# Patient Record
Sex: FEMALE | Race: WHITE | NOT HISPANIC OR LATINO | Employment: STUDENT | URBAN - METROPOLITAN AREA
[De-identification: names, ages, dates, MRNs, and addresses within clinical notes are randomized per-mention and may not be internally consistent; named-entity substitution may affect disease eponyms.]

---
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
## 2020-09-30 ENCOUNTER — TRANSFERRED RECORDS (OUTPATIENT)
Dept: HEALTH INFORMATION MANAGEMENT | Facility: CLINIC | Age: 20
End: 2020-09-30

## 2021-03-26 ENCOUNTER — MEDICAL CORRESPONDENCE (OUTPATIENT)
Dept: HEALTH INFORMATION MANAGEMENT | Facility: CLINIC | Age: 21
End: 2021-03-26

## 2021-03-26 ENCOUNTER — TRANSFERRED RECORDS (OUTPATIENT)
Dept: HEALTH INFORMATION MANAGEMENT | Facility: CLINIC | Age: 21
End: 2021-03-26

## 2021-03-30 ENCOUNTER — TRANSCRIBE ORDERS (OUTPATIENT)
Dept: OTHER | Age: 21
End: 2021-03-30

## 2021-03-30 DIAGNOSIS — R55 VASOVAGAL SYNCOPE: Primary | ICD-10-CM

## 2021-04-12 ENCOUNTER — OFFICE VISIT (OUTPATIENT)
Dept: CARDIOLOGY | Facility: CLINIC | Age: 21
End: 2021-04-12
Attending: NURSE PRACTITIONER
Payer: COMMERCIAL

## 2021-04-12 ENCOUNTER — PRE VISIT (OUTPATIENT)
Dept: CARDIOLOGY | Facility: CLINIC | Age: 21
End: 2021-04-12

## 2021-04-12 VITALS
SYSTOLIC BLOOD PRESSURE: 102 MMHG | DIASTOLIC BLOOD PRESSURE: 66 MMHG | WEIGHT: 108.9 LBS | HEIGHT: 70 IN | HEART RATE: 80 BPM | BODY MASS INDEX: 15.59 KG/M2 | OXYGEN SATURATION: 99 %

## 2021-04-12 DIAGNOSIS — R55 NEAR SYNCOPE: Primary | ICD-10-CM

## 2021-04-12 DIAGNOSIS — R55 VASOVAGAL SYNCOPE: ICD-10-CM

## 2021-04-12 DIAGNOSIS — I95.1 ORTHOSTATIC HYPOTENSION: ICD-10-CM

## 2021-04-12 PROCEDURE — G0463 HOSPITAL OUTPT CLINIC VISIT: HCPCS

## 2021-04-12 PROCEDURE — 99204 OFFICE O/P NEW MOD 45 MIN: CPT | Performed by: INTERNAL MEDICINE

## 2021-04-12 RX ORDER — LIDOCAINE 40 MG/G
CREAM TOPICAL
Status: CANCELLED | OUTPATIENT
Start: 2021-04-12

## 2021-04-12 RX ORDER — FLUDROCORTISONE ACETATE 0.1 MG/1
TABLET ORAL 2 TIMES DAILY
COMMUNITY
Start: 2021-01-07

## 2021-04-12 RX ORDER — MIDODRINE HYDROCHLORIDE 5 MG/1
5 TABLET ORAL
COMMUNITY
Start: 2021-03-30

## 2021-04-12 RX ORDER — TRAZODONE HYDROCHLORIDE 50 MG/1
75 TABLET, FILM COATED ORAL
COMMUNITY

## 2021-04-12 RX ORDER — SERTRALINE HYDROCHLORIDE 100 MG/1
TABLET, FILM COATED ORAL
COMMUNITY
Start: 2021-03-30

## 2021-04-12 RX ORDER — SODIUM CHLORIDE 9 MG/ML
INJECTION, SOLUTION INTRAVENOUS CONTINUOUS
Status: CANCELLED | OUTPATIENT
Start: 2021-04-12

## 2021-04-12 SDOH — HEALTH STABILITY: MENTAL HEALTH: HOW OFTEN DO YOU HAVE A DRINK CONTAINING ALCOHOL?: NEVER

## 2021-04-12 SDOH — HEALTH STABILITY: MENTAL HEALTH: HOW MANY STANDARD DRINKS CONTAINING ALCOHOL DO YOU HAVE ON A TYPICAL DAY?: NOT ASKED

## 2021-04-12 SDOH — HEALTH STABILITY: MENTAL HEALTH: HOW OFTEN DO YOU HAVE 6 OR MORE DRINKS ON ONE OCCASION?: NEVER

## 2021-04-12 ASSESSMENT — PAIN SCALES - GENERAL: PAINLEVEL: NO PAIN (0)

## 2021-04-12 ASSESSMENT — MIFFLIN-ST. JEOR: SCORE: 1337.87

## 2021-04-12 NOTE — NURSING NOTE
Chief Complaint   Patient presents with     New Patient     New Patient     Vitals were taken and medications where reconciled.    Chirag Ramachandran, EMT  3:16 PM

## 2021-04-12 NOTE — PROGRESS NOTES
HPI:     Clotilde is a 19 yo female referred by Dr Back ([facility]) for evaluation immediate orthostatic hypotension and near syncope.  Patient is here with her mother.    Clotilde has had similar problems for many years despite treatment attempts with fludrocortisone and midodrine (15 mg daily).  She has not had linda syncope but comes close.  There are no other family members with similar symptoms.    Patient was evaluated by tilt table testing in [facility].  She apparently had a near syncopal event on the table but I do not have the results.  Subsequently she has been evaluated at [facility] in [place] and it was there that fludrocortisone and midodrine were started.    At this visit patient indicates that she has otherwise been well.  She rides horses on a regular basis and tolerates that well.  She consumes approximately 40 ounces of Gatorade a day as well as additional fluids.    Today we spent approximately 30 minutes reviewing the physiology of orthostatic hypotension.  I indicated that we would focus primarily on physical therapies including standing training and lower body isometric exercise.  I have asked her to eliminate Gatorade and substitute an electrolyte drink with lower carbohydrate load    We will arrange for autonomic testing although I believe that she is likely normal.    Apart from the immediate Hypotension, Clotilde notes hand tremor and some numbness in her legs.    There is no comparable family history    PAST MEDICAL HISTORY:  No past medical history on file.    CURRENT MEDICATIONS:  Current Outpatient Medications   Medication Sig Dispense Refill     midodrine (PROAMATINE) 5 MG tablet Take 5 mg by mouth       fludrocortisone (FLORINEF) 0.1 MG tablet Take by mouth 2 times daily       sertraline (ZOLOFT) 100 MG tablet        traZODone (DESYREL) 50 MG tablet Take 75 mg by mouth         PAST SURGICAL HISTORY:  No past surgical history on  "file.    ALLERGIES:   No Known Allergies    FAMILY HISTORY:  No family history on file.      SOCIAL HISTORY:  Social History     Tobacco Use     Smoking status: Never Smoker     Smokeless tobacco: Never Used   Substance Use Topics     Alcohol use: Never     Frequency: Never     Binge frequency: Never     Drug use: Never       ROS:   Constitutional: No fever, chills, or sweats. Weight stable.   ENT: No visual disturbance, ear ache, epistaxis, sore throat.   Cardiovascular: As per HPI.   Respiratory: No cough, hemoptysis.    GI: No nausea, vomiting,   Integument: Negative.   Psychiatric: Negative.   Hematologic: no easy bleeding.  Neuro: see HPI.   Endocrinology: No significant heat or cold intolerance   Musculoskeletal: No myalgia.    Exam:  /66 (BP Location: Right arm, Patient Position: Chair, Cuff Size: Adult Regular)   Pulse 80   Ht 1.768 m (5' 9.6\")   Wt 49.4 kg (108 lb 14.4 oz)   SpO2 99%   BMI 15.81 kg/m    GENERAL APPEARANCE: healthy, alert and no distress  HEENT: mucosa moist, no central cyanosis  NECK: , JVP not elevated  RESPIRATORY: no rales, rhonchi or wheezes, no use of accessory muscles, no retractions, respirations are unlabored, normal respiratory rate  CARDIOVASCULAR: regular rhythm, normal S1 with physiologic split S2, no S3 or S4 and no murmur, click or rub, precordium quiet with normal PMI.  ABDOMEN: soft, non tender,   ,EXTREMITIES: peripheral pulses normal, no edema, no bruits  NEURO: alert and oriented to person/place/time, normal speech, gait and affect  SKIN: no ecchymoses, no rashes    Labs:  CBC RESULTS:   No results found for: WBC, RBC, HGB, HCT, MCV, MCH, MCHC, RDW, PLT    BMP RESULTS:  No results found for: NA, POTASSIUM, CHLORIDE, CO2, ANIONGAP, GLC, BUN, CR, GFRESTIMATED, GFRESTBLACK, MK     INR RESULTS:  No results found for: INR    Procedures:  PULMONARY FUNCTION TESTS:   No flowsheet data found.      ECHOCARDIOGRAM:   No results found for this or any previous visit (from " the past 8760 hour(s)).      Assessment and Plan:   1.  Immediate orthostatic hypotension refractory to midodrine and fludrocortisone  2.  Raynaud's phenomena  3.  Near syncope  4.  Tremor    Plan  1.  Schedule autonomic testing  2.  Patient to begin standing training and lower body isometrics along with tensing maneuvers when trying to stand up  3.  Continue current medications for now but discontinue Gatorade and substitute lower carbohydrate electrolyte drinks to 60 ounces per day    Total time with documentation 45 minutes    I very much appreciated the opportunity to see and assess Clotilde Calloway in the clinic today. Please do not hesitate to contact my office if you have any questions or concerns.      Jeronimo Howard MD  Cardiac Arrhythmia Service  [facility]  [phone]      CC  ERIC ZEPEDA

## 2021-04-12 NOTE — PATIENT INSTRUCTIONS
You were seen in the Electrophysiology Clinic today by: Jeronimo Howard    Plan:     Labs/Tests Needed:     Autonomic testing, also called a tilt table test, to be schedule. Rema, our EP , will call you to set up a date/time for the testing.     Follow up visit:    After tilt table testing    Further Instructions:    Standing training pamphlet    Use alternative to gatorade       Your Care Team:  EP Cardiology   Telephone Number     Mayuri Khan RN [phone]     For scheduling appts or procedures:    Rema Bush   [phone]   For the Device Clinic (Pacemakers, ICDs, Loop Recorders)    During business hours: [phone]  After business hours:   [phone]- select option 4 and ask for job code [id number].     On-call cardiologist for after hours or on weekends: [phone], option #4, and ask to speak to the on-call cardiologist.     Cardiovascular Clinic:   [address] [place] [address]. [place] [address]      As always, Thank you for trusting us with your health care needs!         You are scheduled for a Tilt Table/Autonomic study with Dr. Jeronimo Howard MD      You will need to undergo a COVID-19 PCR swab test within 4 days of procedure. You will receive a phone call with more information. If you do not hear from the COVID scheduling team, please call: [phone] to schedule.    Below are instructions, if you have questions please contact our office.     1. You should arrive at the [facility] at ___TBD____  ([address] [facility], [place]: [phone]).    2. Report to the GOLD waiting room. Your procedure will be done in the Catheterization Lab.     3. Wear comfortable clothing. (sweat/yoga pants, t-shirt). Please allow 3-4 hours for this procedure to be completed.     4. Do not eat or drink ANYTHING for 6 hours prior to arrival.     5. The morning of your procedure, you may take any scheduled medications with a SIP of water- unless you were instructed differently  by your physician.   Do not take any vitamins, minerals or herbal supplements.     6. You may drive yourself to and from this procedure.       If you have further questions, please utilize [employer] to contact us.   If your question concerns the above instructions, contact:  JEAN-PIERRE HooperN, RN, PHN  Electrophysiology Nurse Coordinator.  [phone]    If your question concerns the schedule/appointment times, contact:  DAYANARA [facility]   [phone]

## 2021-04-12 NOTE — LETTER
4/12/2021      RE: Clotilde Calloway  [address]  [facility] [address]       Dear Colleague,    Thank you for the opportunity to participate in the care of your patient, Clotilde Calloway, at the [facility] HEART [facility]. Please see a copy of my visit note below.    HPI:     Clotilde is a 19 yo female referred by Dr Back ([facility]) for evaluation immediate orthostatic hypotension and near syncope.  Patient is here with her mother.    Clotilde has had similar problems for many years despite treatment attempts with fludrocortisone and midodrine (15 mg daily).  She has not had linda syncope but comes close.  There are no other family members with similar symptoms.    Patient was evaluated by tilt table testing in [facility].  She apparently had a near syncopal event on the table but I do not have the results.  Subsequently she has been evaluated at [facility] in [place] and it was there that fludrocortisone and midodrine were started.    At this visit patient indicates that she has otherwise been well.  She rides horses on a regular basis and tolerates that well.  She consumes approximately 40 ounces of Gatorade a day as well as additional fluids.    Today we spent approximately 30 minutes reviewing the physiology of orthostatic hypotension.  I indicated that we would focus primarily on physical therapies including standing training and lower body isometric exercise.  I have asked her to eliminate Gatorade and substitute an electrolyte drink with lower carbohydrate load    We will arrange for autonomic testing although I believe that she is likely normal.    Apart from the immediate Hypotension, Clotilde notes hand tremor and some numbness in her legs.    There is no comparable family history    PAST MEDICAL HISTORY:  No past medical history on file.    CURRENT MEDICATIONS:  Current Outpatient Medications  "  Medication Sig Dispense Refill     midodrine (PROAMATINE) 5 MG tablet Take 5 mg by mouth       fludrocortisone (FLORINEF) 0.1 MG tablet Take by mouth 2 times daily       sertraline (ZOLOFT) 100 MG tablet        traZODone (DESYREL) 50 MG tablet Take 75 mg by mouth         PAST SURGICAL HISTORY:  No past surgical history on file.    ALLERGIES:   No Known Allergies    FAMILY HISTORY:  No family history on file.      SOCIAL HISTORY:  Social History     Tobacco Use     Smoking status: Never Smoker     Smokeless tobacco: Never Used   Substance Use Topics     Alcohol use: Never     Frequency: Never     Binge frequency: Never     Drug use: Never       ROS:   Constitutional: No fever, chills, or sweats. Weight stable.   ENT: No visual disturbance, ear ache, epistaxis, sore throat.   Cardiovascular: As per HPI.   Respiratory: No cough, hemoptysis.    GI: No nausea, vomiting,   Integument: Negative.   Psychiatric: Negative.   Hematologic: no easy bleeding.  Neuro: see HPI.   Endocrinology: No significant heat or cold intolerance   Musculoskeletal: No myalgia.    Exam:  /66 (BP Location: Right arm, Patient Position: Chair, Cuff Size: Adult Regular)   Pulse 80   Ht 1.768 m (5' 9.6\")   Wt 49.4 kg (108 lb 14.4 oz)   SpO2 99%   BMI 15.81 kg/m    GENERAL APPEARANCE: healthy, alert and no distress  HEENT: mucosa moist, no central cyanosis  NECK: , JVP not elevated  RESPIRATORY: no rales, rhonchi or wheezes, no use of accessory muscles, no retractions, respirations are unlabored, normal respiratory rate  CARDIOVASCULAR: regular rhythm, normal S1 with physiologic split S2, no S3 or S4 and no murmur, click or rub, precordium quiet with normal PMI.  ABDOMEN: soft, non tender,   ,EXTREMITIES: peripheral pulses normal, no edema, no bruits  NEURO: alert and oriented to person/place/time, normal speech, gait and affect  SKIN: no ecchymoses, no rashes    Labs:  CBC RESULTS:   No results found for: WBC, RBC, HGB, HCT, MCV, MCH, " MCHC, RDW, PLT    BMP RESULTS:  No results found for: NA, POTASSIUM, CHLORIDE, CO2, ANIONGAP, GLC, BUN, CR, GFRESTIMATED, GFRESTBLACK, MK     INR RESULTS:  No results found for: INR    Procedures:  PULMONARY FUNCTION TESTS:   No flowsheet data found.      ECHOCARDIOGRAM:   No results found for this or any previous visit (from the past 8760 hour(s)).      Assessment and Plan:   1.  Immediate orthostatic hypotension refractory to midodrine and fludrocortisone  2.  Raynaud's phenomena  3.  Near syncope  4.  Tremor    Plan  1.  Schedule autonomic testing  2.  Patient to begin standing training and lower body isometrics along with tensing maneuvers when trying to stand up  3.  Continue current medications for now but discontinue Gatorade and substitute lower carbohydrate electrolyte drinks to 60 ounces per day    Total time with documentation 45 minutes    I very much appreciated the opportunity to see and assess Clotilde Calloway in the clinic today. Please do not hesitate to contact my office if you have any questions or concerns.      Jeronimo Howard MD  Cardiac Arrhythmia Service  [facility]  [phone]      CC  ERIC ZEPEDA

## 2021-04-13 DIAGNOSIS — Z11.59 ENCOUNTER FOR SCREENING FOR OTHER VIRAL DISEASES: ICD-10-CM

## 2021-04-14 ENCOUNTER — CARE COORDINATION (OUTPATIENT)
Dept: CARDIOLOGY | Facility: CLINIC | Age: 21
End: 2021-04-14

## 2021-04-14 DIAGNOSIS — Z11.59 ENCOUNTER FOR SCREENING FOR OTHER VIRAL DISEASES: Primary | ICD-10-CM

## 2021-04-14 NOTE — PROGRESS NOTES
Covid PCR order faxed to [facility], attn: Lab and Dr. Thao, to be done 4/15/2021 for 4/19/2021 procedure. Instructed to fax back to: [phone].    [facility]  F: [phone]  Ph: [phone]  Primary: Kamala Khan, JEAN-PIERREN, RN, PHN  Electrophysiology Nurse Coordinator

## 2021-04-16 ENCOUNTER — TELEPHONE (OUTPATIENT)
Dept: CARDIOLOGY | Facility: CLINIC | Age: 21
End: 2021-04-16

## 2021-04-16 DIAGNOSIS — Z11.59 ENCOUNTER FOR SCREENING FOR OTHER VIRAL DISEASES: ICD-10-CM

## 2021-04-16 NOTE — TELEPHONE ENCOUNTER
Left voicemail for patient to complete Travel Screen for Cardiac Cath Lab appointment on 4/19/21 and inform patient of updated Visitor Policy.  COVID result pending.  Lindsay Frey RN

## 2021-04-19 ENCOUNTER — DOCUMENTATION ONLY (OUTPATIENT)
Dept: CARDIOLOGY | Facility: CLINIC | Age: 21
End: 2021-04-19

## 2021-04-19 NOTE — PROGRESS NOTES
Autonomic testing rescheduled to 4/28.   New covid order faxed to [facility] Lab and Dr. Thao at [phone]. Requested test to be done 4 days prior on 4/24. Instructions to fax back to [phone] included.     Mayuri Khan, JEAN-PIERREN, RN, PHN  Electrophysiology Nurse Coordinator

## 2021-04-24 ENCOUNTER — EXTERNAL ORDER RESULTS (OUTPATIENT)
Dept: TRANSPLANT | Facility: CLINIC | Age: 21
End: 2021-04-24

## 2021-04-27 ENCOUNTER — TELEPHONE (OUTPATIENT)
Dept: CARDIOLOGY | Facility: CLINIC | Age: 21
End: 2021-04-27

## 2021-04-27 NOTE — TELEPHONE ENCOUNTER
Left voicemail for patient to complete Travel Screen for Cardiac Cath Lab appointment on 4/28/2021 and inform patient of updated Visitor Policy.

## 2021-04-28 ENCOUNTER — HOSPITAL ENCOUNTER (OUTPATIENT)
Facility: CLINIC | Age: 21
Discharge: HOME | End: 2021-04-28
Attending: INTERNAL MEDICINE | Admitting: INTERNAL MEDICINE
Payer: COMMERCIAL

## 2021-04-28 ENCOUNTER — APPOINTMENT (OUTPATIENT)
Dept: MEDSURG UNIT | Facility: CLINIC | Age: 21
End: 2021-04-28
Attending: INTERNAL MEDICINE
Payer: COMMERCIAL

## 2021-04-28 ENCOUNTER — APPOINTMENT (OUTPATIENT)
Dept: LAB | Facility: CLINIC | Age: 21
End: 2021-04-28
Attending: INTERNAL MEDICINE
Payer: COMMERCIAL

## 2021-04-28 VITALS
SYSTOLIC BLOOD PRESSURE: 130 MMHG | OXYGEN SATURATION: 100 % | HEART RATE: 83 BPM | DIASTOLIC BLOOD PRESSURE: 73 MMHG | TEMPERATURE: 99.2 F | RESPIRATION RATE: 18 BRPM

## 2021-04-28 DIAGNOSIS — R55 NEAR SYNCOPE: ICD-10-CM

## 2021-04-28 DIAGNOSIS — I95.1 ORTHOSTATIC HYPOTENSION: ICD-10-CM

## 2021-04-28 LAB
ANION GAP SERPL CALCULATED.3IONS-SCNC: 3 MMOL/L (ref 3–14)
BUN SERPL-MCNC: 10 MG/DL (ref 7–30)
CALCIUM SERPL-MCNC: 9 MG/DL (ref 8.5–10.1)
CHLORIDE SERPL-SCNC: 105 MMOL/L (ref 94–109)
CO2 SERPL-SCNC: 28 MMOL/L (ref 20–32)
CREAT SERPL-MCNC: 0.68 MG/DL (ref 0.52–1.04)
GFR SERPL CREATININE-BSD FRML MDRD: >90 ML/MIN/{1.73_M2}
GLUCOSE SERPL-MCNC: 83 MG/DL (ref 70–99)
POTASSIUM SERPL-SCNC: 3.7 MMOL/L (ref 3.4–5.3)
SODIUM SERPL-SCNC: 136 MMOL/L (ref 133–144)

## 2021-04-28 PROCEDURE — 93660 TILT TABLE EVALUATION: CPT | Performed by: INTERNAL MEDICINE

## 2021-04-28 PROCEDURE — 95921 AUTONOMIC NRV PARASYM INERVJ: CPT | Performed by: INTERNAL MEDICINE

## 2021-04-28 PROCEDURE — 272N000001 HC OR GENERAL SUPPLY STERILE: Performed by: INTERNAL MEDICINE

## 2021-04-28 PROCEDURE — 80048 BASIC METABOLIC PNL TOTAL CA: CPT | Performed by: INTERNAL MEDICINE

## 2021-04-28 PROCEDURE — 999N000054 HC STATISTIC EKG NON-CHARGEABLE

## 2021-04-28 PROCEDURE — 93660 TILT TABLE EVALUATION: CPT | Mod: 26 | Performed by: INTERNAL MEDICINE

## 2021-04-28 PROCEDURE — 999N000132 HC STATISTIC PP CARE STAGE 1

## 2021-04-28 PROCEDURE — 95921 AUTONOMIC NRV PARASYM INERVJ: CPT | Mod: 26 | Performed by: INTERNAL MEDICINE

## 2021-04-28 PROCEDURE — 93010 ELECTROCARDIOGRAM REPORT: CPT | Mod: 59 | Performed by: INTERNAL MEDICINE

## 2021-04-28 PROCEDURE — 82384 ASSAY THREE CATECHOLAMINES: CPT | Performed by: INTERNAL MEDICINE

## 2021-04-28 RX ORDER — SODIUM CHLORIDE 9 MG/ML
INJECTION, SOLUTION INTRAVENOUS CONTINUOUS
Status: DISCONTINUED | OUTPATIENT
Start: 2021-04-28 | End: 2021-04-28 | Stop reason: HOSPADM

## 2021-04-28 RX ORDER — LIDOCAINE 40 MG/G
CREAM TOPICAL
Status: DISCONTINUED | OUTPATIENT
Start: 2021-04-28 | End: 2021-04-28 | Stop reason: HOSPADM

## 2021-04-28 NOTE — IP AVS SNAPSHOT
"After Visit Summary Template Not Found    This Print Group is only intended to be used in the After Visit Summary and can only be used in a report that uses a released After Visit Summary Template.                       MRN:[id number]                      After Visit Summary   4/28/2021    Clotilde Calloway    MRN: [id number]           Visit Information        Department      4/28/2021 11:28 AM [facility]          Review of your medicines      UNREVIEWED medicines. Ask your doctor about these medicines       Dose / Directions   fludrocortisone 0.1 MG tablet  Commonly known as: FLORINEF      Take by mouth 2 times daily  Refills: 0     midodrine 5 MG tablet  Commonly known as: PROAMATINE      Dose: 5 mg  Take 5 mg by mouth  Refills: 0     sertraline 100 MG tablet  Commonly known as: ZOLOFT      Refills: 0     traZODone 50 MG tablet  Commonly known as: DESYREL      Dose: 75 mg  Take 75 mg by mouth  Refills: 0              Protect others around you: Learn how to safely use, store and throw away your medicines at www.disposemymeds.org.       Follow-ups after your visit       Care Instructions       Further instructions from your care team       [facility]  DISCHARGE INSTRUCTIONS FOR   TILT TABLE WITH  VASOVAGAL SYNCOPE    Date:4/28/21  Plan:  1. Daily low carbohydrate electrolyte drink (e.g.Pedialyte or Propel) >60oz/daily  2. Increase Fludro to 2mg/day  3. Vitassium 4 capsules twice a day. PLUS Salt ad gonzales at table  4. \"SPANX\" compression undergarment during waking hours  5. Lower body isometrics (rowing and/or exercise band) and Standing training protocol    Cardiovascular Clinic:  [address] [place] [address], [address]    Your Care Team:        EP Cardiology      Telephone Number         Dr. Jeronimo Loza                [phone]         Gayle Domingo, LENIN Jordan RN              " [phone]         For Scheduling Appointments or              Procedures:      Rema Miranda               [phone]       As always, Thank you for trusting us with your health care needs! I appreciated the opportunity to see and assess Clotilde Calloway and direct the procedure.    Additional Information About Your Visit       MyChart Information    [employer] lets you send messages to your doctor, view your test results, renew your prescriptions, schedule appointments and more. To sign up, go to [email or web address] . Click on \"Log in\" on the left side of the screen, which will take you to the Welcome page. Then click on \"Sign up Now\" on the right side of the page.     You will be asked to enter the access code listed below, as well as some personal information. Please follow the directions to create your username and password.     Your access code is: [email or web address]  Expires: 2021  6:30 AM     Your access code will  in 60 days. If you need help or a new code, please call your   [facility] clinic or [phone].       Care EveryWhere ID    This is your Care EveryWhere ID. This could be used by other organizations to access your [facility] medical records  [id number]       Your Vitals Were  Most recent update: 2021  4:16 PM    Blood Pressure   114/78 (BP Location: Right arm)    Pulse   70    Temperature   99.2  F (37.3  C) (Oral)    Respirations   18    Pulse Oximetry   100%          Primary Care Provider Office Phone # Fax #    Pia POTTS Adriano [phone]      Equal Access to Services    [facility]: Hadii aad ku hadasho Soomaali, waaxda luqadaha, qaybta kaalmada adeegyahieu, terri muse . So [facility] [phone].    ATENCIÓN: Si habla español, tiene a springer disposición servicios gratuitos de asistencia lingüística. Llame al [phone].    We comply with applicable federal and state civil rights laws, including the [place] Human Rights Act. We " do not discriminate on the basis of race, color, creed, Episcopal, national origin, marital status, age, disability, sex, sexual orientation, or gender identity.    If you would like an itemization of your charges they will now be available in [employer] 30 days after discharge. To access the itemized statements in [employer] go to billing/billing summary. From there select view account. There will be multiple tabs showing an overview of your account, detail, payments, and communications. From the communications tab you can see your monthly statements, your itemized statements, and any billing letters generated for your account. If you do not have a [employer] account and need help getting access please contact [employer] support at [phone].  If you would prefer to have your itemized statements mailed please contact our automated itemized bill request line at [phone] option  2.       Thank you!    Thank you for choosing [facility] for your care. Our goal is always to provide you with excellent care. Hearing back from our patients is one way we can continue to improve our services. Please take a few minutes to complete the written survey that you may receive in the mail after you visit with us. Thank you!            Medication List      ASK your doctor about these medications          Morning Afternoon Evening Bedtime As Needed    fludrocortisone 0.1 MG tablet  Also known as: FLORINEF  INSTRUCTIONS: Take by mouth 2 times daily                     midodrine 5 MG tablet  Also known as: PROAMATINE  INSTRUCTIONS: Take 5 mg by mouth                     sertraline 100 MG tablet  Also known as: ZOLOFT                     traZODone 50 MG tablet  Also known as: DESYREL  INSTRUCTIONS: Take 75 mg by mouth

## 2021-04-28 NOTE — PROGRESS NOTES
2A prep for Tilt table. VSS. Pt alert, oriented and aware of planned procedure. Mother Heidi at bedside. Left PIV in place and infusing. Prep questions complete

## 2021-04-28 NOTE — PROGRESS NOTES
S/p Tilt Table- VSS. Denies nausea or dizziness. Doctor Lee here to talk with pt. Mother present. Pt tolerating food/drink. PIV removed. discharge instructions reviewed with pt and mother. Pt verbalized understanding of instructions and signed papers.

## 2021-04-28 NOTE — DISCHARGE INSTRUCTIONS
"[facility]  DISCHARGE INSTRUCTIONS FOR   TILT TABLE WITH  VASOVAGAL SYNCOPE    Date:4/28/21  Plan:  1. Daily low carbohydrate electrolyte drink (e.g.Pedialyte or Propel) >60oz/daily  2. Increase Fludro to 2mg/day  3. Vitassium 4 capsules twice a day. PLUS Salt ad gonzales at table  4. \"SPANX\" compression undergarment during waking hours  5. Lower body isometrics (rowing and/or exercise band) and Standing training protocol    Cardiovascular Clinic:  [address] [place] [address], [address]    Your Care Team:        EP Cardiology      Telephone Number         Dr. Jeronimo Loza                [phone]         Gayle Domingo, RN    Maureen Jordan RN              [phone]         For Scheduling Appointments or              Procedures:      Rema Miranda               [phone]       As always, Thank you for trusting us with your health care needs! I appreciated the opportunity to see and assess Clotilde Calloway and direct the procedure.  "

## 2021-04-28 NOTE — IP AVS SNAPSHOT
[facility]  [address] [place] [id number]  Phone: [phone]                                    After Visit Summary   4/28/2021    Clotilde Calloway    MRN: [id number]           After Visit Summary Signature Page    I have received my discharge instructions, and my questions have been answered. I have discussed any challenges I see with this plan with the nurse or doctor.    ..........................................................................................................................................  Patient/Patient Representative Signature      ..........................................................................................................................................  Patient Representative Print Name and Relationship to Patient    ..................................................               ................................................  Date                                   Time    ..........................................................................................................................................  Reviewed by Signature/Title    ...................................................              ..............................................  Date                                               Time          22EPIC Rev 08/18

## 2021-04-29 LAB — INTERPRETATION ECG - MUSE: NORMAL

## 2021-05-03 LAB
CATECHOLS PLAS-IMP: NORMAL
CATECHOLS PLAS-IMP: NORMAL
DOPAMINE SERPL-MCNC: <20 PG/ML (ref 0–20)
DOPAMINE SERPL-MCNC: <20 PG/ML (ref 0–20)
EPINEPH PLAS-MCNC: 102 PG/ML (ref 10–200)
EPINEPH PLAS-MCNC: 46 PG/ML (ref 10–200)
NOREPINEPH PLAS-MCNC: 389 PG/ML (ref 80–520)
NOREPINEPH PLAS-MCNC: 445 PG/ML (ref 80–520)

## 2023-12-19 NOTE — H&P
H&P    HPI  Clotilde is a 19 yo female referred by Dr Back ([facility]) for evaluation immediate orthostatic hypotension and near syncope.  Patient is here with her mother.     Clotilde has had similar problems for many years despite treatment attempts with fludrocortisone and midodrine (15 mg daily).  She has not had linda syncope      Patient was evaluated by tilt table testing in [facility] but I do not have the results.  Subsequently she has been evaluated at [facility] in [place] and it was there that fludrocortisone and midodrine were started.     Family history: Noncontributory    Medications; see medication list.  Includes midodrine 5 mg 3 times daily and fludrocortisone 0.1 mg daily    Review of systems  Head neck visual disturbance associated with prolonged standing.  No headache  Cardiovascular: See HPI  Respiratory: No cough sputum or wheezing  GI: No pertinent complaint  : No pertinent complaint  Musculoskeletal: Normal  Neurologic: See HPI    Physical exam  Normotensive alert oriented and in no distress  Tall thin young woman  Head and neck: Normal  Respiratory: No adventitious sounds detected  Abdomen: Nontender  Extremities: No evidence of edema or cyanosis  Neurologic: Normal responsiveness and affect with no localizing signs    ECG today: Sinus bradycardia otherwise normal    Plan: Autonomic study to assess postural near syncope    Consent obtained on chart and witnessed by mother who was present at the time    I very much appreciated the opportunity to see and assess Clotilde Calloway in the hospital  [address]. The note above summarizes my findings and current recommendations.  Please do not hesitate to contact my office if you have any questions or concerns.      Jeronimo Howard MD  Cardiac Arrhythmia Service  [facility]  [phone]      
Detail Level: Detailed
Was A Bandage Applied: Yes
Punch Size In Mm: 4
Size Of Lesion In Cm (Optional): 0
Depth Of Punch Biopsy: dermis
Biopsy Type: H and E
Anesthesia Type: 1% lidocaine with epinephrine
Anesthesia Volume In Cc: 0.8
Hemostasis: None
Epidermal Sutures: gel foam
Wound Care: Vaseline
Dressing: bandage
Patient Will Remove Sutures At Home?: No
Lab: [id number]
Lab Facility: [id number]
Path Notes (To The Dermatopathologist): Size: 4 mm
Consent: Written consent was obtained and risks were reviewed including but not limited to scarring, infection, bleeding, scabbing, incomplete removal, nerve damage and allergy to anesthesia.
Post-Care Instructions: I reviewed with the patient in detail post-care instructions. Patient is to keep the biopsy site dry overnight, and then apply bacitracin twice daily until healed. Patient may apply hydrogen peroxide soaks to remove any crusting.
Home Suture Removal Text: Patient was provided a home suture removal kit and will remove their sutures at home.  If they have any questions or difficulties they will call the office.
Notification Instructions: Patient will be notified of biopsy results. However, patient instructed to call the office if not contacted within 2 weeks.
Billing Type: Third-Party Bill
Information: Selecting Yes will display possible errors in your note based on the variables you have selected. This validation is only offered as a suggestion for you. PLEASE NOTE THAT THE VALIDATION TEXT WILL BE REMOVED WHEN YOU FINALIZE YOUR NOTE. IF YOU WANT TO FAX A PRELIMINARY NOTE YOU WILL NEED TO TOGGLE THIS TO 'NO' IF YOU DO NOT WANT IT IN YOUR FAXED NOTE.

## (undated) DEVICE — CUFF FINGER CLEARSIGHT SMALL CSCS

## (undated) RX ORDER — SODIUM CHLORIDE 9 MG/ML
INJECTION, SOLUTION INTRAVENOUS
Status: DISPENSED
Start: 2021-04-28